# Patient Record
(demographics unavailable — no encounter records)

---

## 2018-04-23 NOTE — CT REPORT
EXAM:

CT MAXILLOFACIAL WITHOUT CONTRAST

 

EXAM DATE: 4/23/2018 02:05 PM.

 

CLINICAL HISTORY: Right orbital injury.

 

COMPARISONS: None.

 

TECHNIQUE: Thin-section axial images were acquired of the face without contrast. Post-processing: Cor
onal and sagittal reformats. Other: None.

 

In accordance with CT protocol optimization, one or more of the following dose reduction techniques w
ere utilized for this exam: automated exposure control, adjustment of mA and/or KV based on patient s
ize, or use of iterative reconstructive technique.

 

FINDINGS:

Soft Tissue: Moderate amount of edema subcutaneous fat right mid face, small amount of edema subjacen
t to thickened right platysma in the same region, and mild right pre-septal edema.

No focal hematoma nor emphysema.

 

Orbits: Symmetric and unremarkable.

 

Bones: No fracture or bone lesion.

 

Temporomandibular Joints: The temporomandibular joints are symmetric and normally located.

 

Sinuses: Normal. No mucosal thickening or fluid levels.

 

Other: None.

 

IMPRESSION: 

1. Moderate right mid face superficial edema. 

2. No bony abnormality.

 

[facility]

Referring Provider Line: [phone]

 

SITE ID: [id number]

## 2018-04-23 NOTE — CT PRELIMINARY REPORT
Accession: [id number]

Exam: CT FACIAL BONES W/O

 

IMPRESSION: 

1. Moderate right mid face superficial edema. 

2. No bony abnormality.

 

[facility]

 

SITE ID: [id number]
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
